# Patient Record
Sex: FEMALE | Race: WHITE | NOT HISPANIC OR LATINO | Employment: STUDENT | URBAN - METROPOLITAN AREA
[De-identification: names, ages, dates, MRNs, and addresses within clinical notes are randomized per-mention and may not be internally consistent; named-entity substitution may affect disease eponyms.]

---
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
## 2022-01-25 RX ORDER — NORETHINDRONE ACETATE AND ETHINYL ESTRADIOL .02; 1 MG/1; MG/1
1 TABLET ORAL DAILY
Qty: 30 TABLET | Refills: 11 | Status: SHIPPED | OUTPATIENT
Start: 2022-01-25 | End: 2023-01-25

## 2023-05-28 DIAGNOSIS — M25.511 RIGHT SHOULDER PAIN, UNSPECIFIED CHRONICITY: Primary | ICD-10-CM

## 2023-05-29 ENCOUNTER — TELEPHONE (OUTPATIENT)
Dept: ORTHOPEDICS | Facility: CLINIC | Age: 23
End: 2023-05-29
Payer: COMMERCIAL

## 2023-05-29 NOTE — TELEPHONE ENCOUNTER
Attempted to reach patient regarding appointment tomorrow. Called to inform patient we have scheduled x-rays prior to the appointment. Mailbox was full, unable to leave VM.

## 2023-05-30 ENCOUNTER — HOSPITAL ENCOUNTER (OUTPATIENT)
Dept: RADIOLOGY | Facility: HOSPITAL | Age: 23
Discharge: HOME | End: 2023-05-30
Attending: STUDENT IN AN ORGANIZED HEALTH CARE EDUCATION/TRAINING PROGRAM
Payer: COMMERCIAL

## 2023-05-30 ENCOUNTER — OFFICE VISIT (OUTPATIENT)
Dept: SPORTS MEDICINE | Facility: CLINIC | Age: 23
End: 2023-05-30
Payer: COMMERCIAL

## 2023-05-30 VITALS — BODY MASS INDEX: 30.22 KG/M2 | WEIGHT: 188 LBS | HEIGHT: 66 IN

## 2023-05-30 DIAGNOSIS — M25.511 RIGHT SHOULDER PAIN, UNSPECIFIED CHRONICITY: ICD-10-CM

## 2023-05-30 DIAGNOSIS — S43.431A LABRAL TEAR OF SHOULDER, RIGHT, INITIAL ENCOUNTER: ICD-10-CM

## 2023-05-30 DIAGNOSIS — M25.511 ACUTE PAIN OF RIGHT SHOULDER: Primary | ICD-10-CM

## 2023-05-30 PROCEDURE — 1159F MED LIST DOCD IN RCRD: CPT | Mod: CPTII,S$GLB,, | Performed by: STUDENT IN AN ORGANIZED HEALTH CARE EDUCATION/TRAINING PROGRAM

## 2023-05-30 PROCEDURE — 1160F PR REVIEW ALL MEDS BY PRESCRIBER/CLIN PHARMACIST DOCUMENTED: ICD-10-PCS | Mod: CPTII,S$GLB,, | Performed by: STUDENT IN AN ORGANIZED HEALTH CARE EDUCATION/TRAINING PROGRAM

## 2023-05-30 PROCEDURE — 73030 XR SHOULDER COMPLETE 2 OR MORE VIEWS RIGHT: ICD-10-PCS | Mod: 26,RT,, | Performed by: RADIOLOGY

## 2023-05-30 PROCEDURE — 99203 OFFICE O/P NEW LOW 30 MIN: CPT | Mod: S$GLB,,, | Performed by: STUDENT IN AN ORGANIZED HEALTH CARE EDUCATION/TRAINING PROGRAM

## 2023-05-30 PROCEDURE — 99203 PR OFFICE/OUTPT VISIT, NEW, LEVL III, 30-44 MIN: ICD-10-PCS | Mod: S$GLB,,, | Performed by: STUDENT IN AN ORGANIZED HEALTH CARE EDUCATION/TRAINING PROGRAM

## 2023-05-30 PROCEDURE — 3008F PR BODY MASS INDEX (BMI) DOCUMENTED: ICD-10-PCS | Mod: CPTII,S$GLB,, | Performed by: STUDENT IN AN ORGANIZED HEALTH CARE EDUCATION/TRAINING PROGRAM

## 2023-05-30 PROCEDURE — 1159F PR MEDICATION LIST DOCUMENTED IN MEDICAL RECORD: ICD-10-PCS | Mod: CPTII,S$GLB,, | Performed by: STUDENT IN AN ORGANIZED HEALTH CARE EDUCATION/TRAINING PROGRAM

## 2023-05-30 PROCEDURE — 99999 PR PBB SHADOW E&M-EST. PATIENT-LVL III: CPT | Mod: PBBFAC,,, | Performed by: STUDENT IN AN ORGANIZED HEALTH CARE EDUCATION/TRAINING PROGRAM

## 2023-05-30 PROCEDURE — 73030 X-RAY EXAM OF SHOULDER: CPT | Mod: 26,RT,, | Performed by: RADIOLOGY

## 2023-05-30 PROCEDURE — 99999 PR PBB SHADOW E&M-EST. PATIENT-LVL III: ICD-10-PCS | Mod: PBBFAC,,, | Performed by: STUDENT IN AN ORGANIZED HEALTH CARE EDUCATION/TRAINING PROGRAM

## 2023-05-30 PROCEDURE — 3008F BODY MASS INDEX DOCD: CPT | Mod: CPTII,S$GLB,, | Performed by: STUDENT IN AN ORGANIZED HEALTH CARE EDUCATION/TRAINING PROGRAM

## 2023-05-30 PROCEDURE — 1160F RVW MEDS BY RX/DR IN RCRD: CPT | Mod: CPTII,S$GLB,, | Performed by: STUDENT IN AN ORGANIZED HEALTH CARE EDUCATION/TRAINING PROGRAM

## 2023-05-30 PROCEDURE — 73030 X-RAY EXAM OF SHOULDER: CPT | Mod: TC,RT

## 2023-05-30 NOTE — PROGRESS NOTES
[facility]     Shoulder Initial Visit         5/30/2023    Referring MD: Jamel Mart*    Chief Complaint   Patient presents with    Right Shoulder - Pain         History of Present Illness:   Fabi Rice is a 23 y.o. right-hand dominant female, college  at [employer] who presents with RIGHT shoulder pain and dysfunction.    Onset of the symptoms was around the end of volleyball season during mid-October     Inciting event: no acute injury, began having pain at the end of volleyball season.      Current symptoms include intermittent sharp pain of the right shoulder.     Pain is aggravated by playing volleyball, particularly overhead serves and during pushups. Patient also endorses pain while laying on right side.       Evaluation to date: X-Ray     Treatment to date: Rest, activity modification, shoulder strengthening with school AT      Past Medical History:   History reviewed. No pertinent past medical history.    Past Surgical History:   History reviewed. No pertinent surgical history.    Medications:  Patient's Medications   New Prescriptions    No medications on file   Previous Medications    NORETHINDRONE-ETHINYL ESTRADIOL (MICROGESTIN 1/20) 1-20 MG-MCG PER TABLET    Take 1 tablet by mouth once daily.   Modified Medications    No medications on file   Discontinued Medications    No medications on file       Allergies: Review of patient's allergies indicates:  No Known Allergies    Social History:   Home town: [place], [place]  Occupation: Volleyball athlete at [employer]   Alcohol use: She reports no history of alcohol use.  Tobacco use: She reports that she has never smoked. She has never used smokeless tobacco.    Review of systems:  History of recent illness, fevers, shakes, or chills: no  History of cardiac problems or chest pain: no  History of pulmonary problems or asthma: no  History of diabetes: no  History of prior dvt or  "clotting problems: no  History of sleep apnea: no      Physical Examination:  Estimated body mass index is 30.34 kg/m² as calculated from the following:    Height as of this encounter: 5' 6" (1.676 m).    Weight as of this encounter: 85.3 kg (188 lb).    General  Healthy appearing female in no acute distress  Alert and oriented, normal mood, appropriate affect    Shoulder Examination:  Patient is alert and oriented, no distress. Skin is intact. Neuro is normal with no focal motor or sensory findings.    Cervical exam is unremarkable. Intact cervical ROM. Negative Spurling's test    Physical Exam:  RIGHT    LEFT    Scap Dyskinesis/Winging (-)    (-)  Forward Shoulder Posture +    +    Tenderness:          Greater Tuberosity             (-)    (-)  Bicipital Groove  +    (-)  AC joint   (-)    (-)  Other:     ROM:  Forward Elevation 180    180  Abduction  120    120  ER (at side)  90    90  IR   T4 / 90 in abd   T4    Strength:   Supraspinatus  5/5    5/5  Infraspinatus  5/5    5/5  Subscap / IR  5/5    5/5     Special Tests:   Apprehension:   *pain    (-)   Abner Relocation:  (-)    (-)   Neer:    (-)    (-)   Mohamud:   +    (-)   SS Stress:   (-)    (-)   Bear Hug:   (-)    (-)   Breathitt's:   +    (-)   Resisted Thrower's:   +    (-)   Cross Arm Abduction:  (-)    (-)   Posterior Load/Shift  +    (-)   Mary Test   +    (-)    Neurovascular examination  - Motor grossly intact bilaterally to shoulder abduction, elbow flexion and extension, wrist flexion and extension, and intrinsic hand musculature  - Sensation intact to light touch bilaterally in axillary, median, radial, and ulnar distributions  - Symmetrical radial pulses      Imaging:  XR Results:  Results for orders placed during the hospital encounter of 05/30/23    X-ray Shoulder 2 or More Views Right    Narrative  EXAM: XR SHOULDER COMPLETE 2 OR MORE VIEWS RIGHT    CLINICAL INDICATION:  Pain    PRIORS:  06/05/2020    FINDINGS:  No acute fracture or dislocation. "  No acute or adverse osseous finding.  Joint spaces maintained.    Impression  No acute or adverse osseous finding    Finalized on: 5/30/2023 4:12 PM By:  Christine Rosenberg MD  [facility]# [id number]      2023-05-30 16:[id number]    [facility]    Physician Read: I agree with the above impression    Impression:  23 y.o. 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 Her symptoms and exam findings are most consistent with injury to the posterior and superior labrum.  She has tried extensive non-operative management including 6 months of combined rest, nsaids, and physical therapy.   I recommend MRI arthrogram of the shoulder to evaluate for labral tear.   We have explained that surgical treatments for this issue are not likely to be an option if she doesn't want to miss her upcoming senior volleyball season.  She may be a candidate for PRP injection.  Follow up with me after MRI           Jamel Mart MD

## 2023-05-31 ENCOUNTER — TELEPHONE (OUTPATIENT)
Dept: SPORTS MEDICINE | Facility: CLINIC | Age: 23
End: 2023-05-31
Payer: COMMERCIAL

## 2023-07-08 NOTE — PROGRESS NOTES
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 female who is here for f/u evaluation of her right shoulder. The patient was last seen here by me on 5/30/23 at which point we decided to send her for an MR arthrogram prior to considering further treatment options. The patient returns today to review her MRI and discuss further treatment options. She reports that her shoulder is not overly symptomatic at this time as she has not been active with sporting activities over the last few months.     To review her history, Fabi Rice is a 23 y.o. right-hand dominant female, college  at [employer] who presented on 5/30/23 with RIGHT shoulder pain and dysfunction that had begun around the end of volleyball season during mid-October. There was no acute injury or trauma. Her symptoms included intermittent sharp pain of the right shoulder. Her pain was aggravated by playing volleyball, particularly overhead serves and during pushups. Patient also endorsed pain while laying on right side. She had tried rest, activity modification, and shoulder strengthening with school AT[employer]. Her symptoms and exam findings are most consistent with injury to the posterior and superior labrum, so we proceeded with MR Arthrogram for further evaluation.     Patient's medications, allergies, past medical, surgical, social and family histories were reviewed and updated as appropriate.    Review of Systems   All systems reviewed were negative.  Specifically, the patient denies fever, chills, weight loss, chest pain, shortness of breath, or dyspnea on exertion.      No past medical history on file.    No past surgical history on file.    Patient's Medications   New Prescriptions    No medications on file   Previous Medications    NORETHINDRONE-ETHINYL  ESTRADIOL (MICROGESTIN 1/20) 1-20 MG-MCG PER TABLET    Take 1 tablet by mouth once daily.   Modified Medications    No medications on file   Discontinued Medications    No medications on file       Family History   Problem Relation Age of Onset    Breast cancer Neg Hx     Ovarian cancer Neg Hx        Review of patient's allergies indicates:  No Known Allergies      Objective:      Physical Exam  Patient is alert and oriented, no distress. Skin is intact. Neuro is normal with no focal motor or sensory findings.    Cervical exam is unremarkable. Intact cervical ROM. Negative Spurling's test    Physical Exam:                       RIGHT                                     LEFT     Scap Dyskinesis/Winging       (-)                                             (-)  Forward Shoulder Posture      +                                              +     Tenderness:                                                                              Greater Tuberosity                  (-)                                            (-)  Bicipital Groove                       +                                              (-)  AC joint                                   (-)                                             (-)  Other:      ROM:  Forward Elevation       180                                          180  Abduction                    120                                          120  ER (at side)                 90                                            90  IR                                 T4 / 90 in abd                          T4     Strength:   Supraspinatus             5/5                                           5/5  Infraspinatus               5/5                                           5/5  Subscap / IR               5/5                                           5/5      Special Tests:              Apprehension:                         *pain                                        (-)              Abner Relocation:                      (-)                                             (-)              Neer:                                       (-)                                             (-)              Mohamud:                                 +                                              (-)              SS Stress:                               (-)                                             (-)              Bear Hug:                                (-)                                             (-)              Campbell's:                                 +                                              (-)              Resisted Thrower's:                +                                              (-)              Cross Arm Abduction:             (-)                                             (-)              Posterior Load/Shift                +                                              (-)              Mary Test                                  +                                              (-)    Neurovascular examination  - Motor grossly intact bilaterally to shoulder abduction, elbow flexion and extension, wrist flexion and extension, and intrinsic hand musculature  - Sensation intact to light touch bilaterally in axillary, median, radial, and ulnar distributions  - Symmetrical radial pulses    Imaging:    XR Results:  Results for orders placed during the hospital encounter of 05/30/23    X-ray Shoulder 2 or More Views Right    Narrative  EXAM: XR SHOULDER COMPLETE 2 OR MORE VIEWS RIGHT    CLINICAL INDICATION:  Pain    PRIORS:  06/05/2020    FINDINGS:  No acute fracture or dislocation.  No acute or adverse osseous finding.  Joint spaces maintained.    Impression  No acute or adverse osseous finding    Finalized on: 5/30/2023 4:12 PM By:  Christine Rosenberg MD  [facility]# [id number]      2023-05-30 [id number]    [facility]      MRI Results:  MRI Arthrogram Shoulder With Contrast Right  Narrative: EXAMINATION:  MRI ARTHROGRAM SHOULDER  WITH CONTRAST RIGHT    CLINICAL HISTORY:  Shoulder pain, labral tear suspected, xray done;  Pain in right shoulder    TECHNIQUE:  MRI of the shoulder was performed after administration of intra-articular contrast utilizing the following sequences: Axial, sagittal and coronal T1 FS; coronal and sagittal T2 FS    COMPARISON:  None.    FINDINGS:  Rotator cuff: Supraspinatus demonstrates articular surface partial tear (7-9 mm in AP dimension), suspected low-grade with some mild intrasubstance interstitial extension.  Infraspinatus, teres minor, and subscapularis tendons are intact.  Muscle bulk is maintained.    Labrum: Posterosuperior labral tear suspected.    Biceps: Long head biceps tendon is intact.    Bone: There is no fracture.  Bone marrow signal is unremarkable.    Acromioclavicular joint: The AC joint is unremarkable.    Cartilage: Articular cartilage of the glenohumeral joint is preserved.  Impression: 1. Posterosuperior tear suspected  2. Supraspinatus articular surface tear, suspected low-grade.    Electronically signed by: Delvis Hays MD  Date:    07/11/2023  Time:    13:42       CT Results:  No results found for this or any previous visit.      Physician read: I agree with the above impression.    Assessment/Plan:   Fabi Rice is a 23 y.o. female with right shoulder partial thickness articular sided rotator cuff tear and posterior superior glenoid labrum tear     Plan:    Reviewed MRI with the patient today. Her MRI shows partial thickness articular sided rotator cuff tear as well as posterior superior glenoid labrum tearing.   We discussed non-operative treatment options in the form of rest, activity modifications, oral anti-inflammatories, corticosteroid versus PRP injection, and physical therapy/physician directed home exercise program versus potential operative treatment.   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   Based on her timeline I recommend US guided intra-articular CSI. We discussed that we can see how she does during the season and afterward to help guide any potential operative treatments of her rotator cuff.   Follow up at end of July for CSI.           Jamel Mart MD    I, Alfonzo Clayton, acted as a scribe for Jamel Mart MD for the duration of this office visit.

## 2023-07-11 ENCOUNTER — HOSPITAL ENCOUNTER (OUTPATIENT)
Dept: RADIOLOGY | Facility: HOSPITAL | Age: 23
Discharge: HOME | End: 2023-07-11
Attending: STUDENT IN AN ORGANIZED HEALTH CARE EDUCATION/TRAINING PROGRAM
Payer: COMMERCIAL

## 2023-07-11 ENCOUNTER — OFFICE VISIT (OUTPATIENT)
Dept: SPORTS MEDICINE | Facility: CLINIC | Age: 23
End: 2023-07-11
Payer: COMMERCIAL

## 2023-07-11 VITALS — BODY MASS INDEX: 30.22 KG/M2 | WEIGHT: 188 LBS | HEIGHT: 66 IN

## 2023-07-11 DIAGNOSIS — M75.111 NONTRAUMATIC INCOMPLETE TEAR OF RIGHT ROTATOR CUFF: Primary | ICD-10-CM

## 2023-07-11 DIAGNOSIS — M25.511 ACUTE PAIN OF RIGHT SHOULDER: ICD-10-CM

## 2023-07-11 DIAGNOSIS — S43.431A LABRAL TEAR OF SHOULDER, RIGHT, INITIAL ENCOUNTER: ICD-10-CM

## 2023-07-11 DIAGNOSIS — S43.431D TEAR OF RIGHT GLENOID LABRUM, SUBSEQUENT ENCOUNTER: ICD-10-CM

## 2023-07-11 PROCEDURE — 1159F MED LIST DOCD IN RCRD: CPT | Mod: CPTII,S$GLB,, | Performed by: STUDENT IN AN ORGANIZED HEALTH CARE EDUCATION/TRAINING PROGRAM

## 2023-07-11 PROCEDURE — 73040 CONTRAST X-RAY OF SHOULDER: CPT | Mod: 26,RT,, | Performed by: RADIOLOGY

## 2023-07-11 PROCEDURE — 73222 MRI JOINT UPR EXTREM W/DYE: CPT | Mod: 26,RT,, | Performed by: RADIOLOGY

## 2023-07-11 PROCEDURE — 25500020 PHARM REV CODE 255: Performed by: STUDENT IN AN ORGANIZED HEALTH CARE EDUCATION/TRAINING PROGRAM

## 2023-07-11 PROCEDURE — 23350 XR ARTHROGRAM SHOULDER RIGHT, COMPLETE (XPD): ICD-10-PCS | Mod: RT,,, | Performed by: RADIOLOGY

## 2023-07-11 PROCEDURE — 1160F PR REVIEW ALL MEDS BY PRESCRIBER/CLIN PHARMACIST DOCUMENTED: ICD-10-PCS | Mod: CPTII,S$GLB,, | Performed by: STUDENT IN AN ORGANIZED HEALTH CARE EDUCATION/TRAINING PROGRAM

## 2023-07-11 PROCEDURE — 73222 MRI ARTHROGRAM SHOULDER WITH CONTRAST RIGHT: ICD-10-PCS | Mod: 26,RT,, | Performed by: RADIOLOGY

## 2023-07-11 PROCEDURE — 73222 MRI JOINT UPR EXTREM W/DYE: CPT | Mod: TC,RT

## 2023-07-11 PROCEDURE — 1159F PR MEDICATION LIST DOCUMENTED IN MEDICAL RECORD: ICD-10-PCS | Mod: CPTII,S$GLB,, | Performed by: STUDENT IN AN ORGANIZED HEALTH CARE EDUCATION/TRAINING PROGRAM

## 2023-07-11 PROCEDURE — 3008F BODY MASS INDEX DOCD: CPT | Mod: CPTII,S$GLB,, | Performed by: STUDENT IN AN ORGANIZED HEALTH CARE EDUCATION/TRAINING PROGRAM

## 2023-07-11 PROCEDURE — 1160F RVW MEDS BY RX/DR IN RCRD: CPT | Mod: CPTII,S$GLB,, | Performed by: STUDENT IN AN ORGANIZED HEALTH CARE EDUCATION/TRAINING PROGRAM

## 2023-07-11 PROCEDURE — 3008F PR BODY MASS INDEX (BMI) DOCUMENTED: ICD-10-PCS | Mod: CPTII,S$GLB,, | Performed by: STUDENT IN AN ORGANIZED HEALTH CARE EDUCATION/TRAINING PROGRAM

## 2023-07-11 PROCEDURE — 23350 INJECTION FOR SHOULDER X-RAY: CPT | Mod: RT,,, | Performed by: RADIOLOGY

## 2023-07-11 PROCEDURE — 99999 PR PBB SHADOW E&M-EST. PATIENT-LVL III: CPT | Mod: PBBFAC,,, | Performed by: STUDENT IN AN ORGANIZED HEALTH CARE EDUCATION/TRAINING PROGRAM

## 2023-07-11 PROCEDURE — 99214 OFFICE O/P EST MOD 30 MIN: CPT | Mod: S$GLB,,, | Performed by: STUDENT IN AN ORGANIZED HEALTH CARE EDUCATION/TRAINING PROGRAM

## 2023-07-11 PROCEDURE — 73040 XR ARTHROGRAM SHOULDER RIGHT, COMPLETE (XPD): ICD-10-PCS | Mod: 26,RT,, | Performed by: RADIOLOGY

## 2023-07-11 PROCEDURE — 99214 PR OFFICE/OUTPT VISIT, EST, LEVL IV, 30-39 MIN: ICD-10-PCS | Mod: S$GLB,,, | Performed by: STUDENT IN AN ORGANIZED HEALTH CARE EDUCATION/TRAINING PROGRAM

## 2023-07-11 PROCEDURE — 99999 PR PBB SHADOW E&M-EST. PATIENT-LVL III: ICD-10-PCS | Mod: PBBFAC,,, | Performed by: STUDENT IN AN ORGANIZED HEALTH CARE EDUCATION/TRAINING PROGRAM

## 2023-07-11 PROCEDURE — 23350 INJECTION FOR SHOULDER X-RAY: CPT | Mod: RT

## 2023-07-11 RX ADMIN — IOHEXOL 15 ML: 350 INJECTION, SOLUTION INTRAVENOUS at 12:07

## 2023-07-28 ENCOUNTER — OFFICE VISIT (OUTPATIENT)
Dept: SPORTS MEDICINE | Facility: CLINIC | Age: 23
End: 2023-07-28
Payer: COMMERCIAL

## 2023-07-28 VITALS — BODY MASS INDEX: 30.22 KG/M2 | HEIGHT: 66 IN | WEIGHT: 188 LBS

## 2023-07-28 DIAGNOSIS — Z02.5 SPORTS PHYSICAL: Primary | ICD-10-CM

## 2023-07-28 DIAGNOSIS — M75.111 NONTRAUMATIC INCOMPLETE TEAR OF RIGHT ROTATOR CUFF: ICD-10-CM

## 2023-07-28 DIAGNOSIS — S43.431D TEAR OF RIGHT GLENOID LABRUM, SUBSEQUENT ENCOUNTER: ICD-10-CM

## 2023-07-28 PROCEDURE — 1159F MED LIST DOCD IN RCRD: CPT | Mod: CPTII,S$GLB,, | Performed by: STUDENT IN AN ORGANIZED HEALTH CARE EDUCATION/TRAINING PROGRAM

## 2023-07-28 PROCEDURE — 99214 PR OFFICE/OUTPT VISIT, EST, LEVL IV, 30-39 MIN: ICD-10-PCS | Mod: 25,S$GLB,, | Performed by: STUDENT IN AN ORGANIZED HEALTH CARE EDUCATION/TRAINING PROGRAM

## 2023-07-28 PROCEDURE — 1159F PR MEDICATION LIST DOCUMENTED IN MEDICAL RECORD: ICD-10-PCS | Mod: CPTII,S$GLB,, | Performed by: STUDENT IN AN ORGANIZED HEALTH CARE EDUCATION/TRAINING PROGRAM

## 2023-07-28 PROCEDURE — 99999 PR PBB SHADOW E&M-EST. PATIENT-LVL III: ICD-10-PCS | Mod: PBBFAC,,, | Performed by: STUDENT IN AN ORGANIZED HEALTH CARE EDUCATION/TRAINING PROGRAM

## 2023-07-28 PROCEDURE — 3008F PR BODY MASS INDEX (BMI) DOCUMENTED: ICD-10-PCS | Mod: CPTII,S$GLB,, | Performed by: STUDENT IN AN ORGANIZED HEALTH CARE EDUCATION/TRAINING PROGRAM

## 2023-07-28 PROCEDURE — 20611 DRAIN/INJ JOINT/BURSA W/US: CPT | Mod: RT,S$GLB,, | Performed by: STUDENT IN AN ORGANIZED HEALTH CARE EDUCATION/TRAINING PROGRAM

## 2023-07-28 PROCEDURE — 99999 PR PBB SHADOW E&M-EST. PATIENT-LVL III: CPT | Mod: PBBFAC,,, | Performed by: STUDENT IN AN ORGANIZED HEALTH CARE EDUCATION/TRAINING PROGRAM

## 2023-07-28 PROCEDURE — 99214 OFFICE O/P EST MOD 30 MIN: CPT | Mod: 25,S$GLB,, | Performed by: STUDENT IN AN ORGANIZED HEALTH CARE EDUCATION/TRAINING PROGRAM

## 2023-07-28 PROCEDURE — 3008F BODY MASS INDEX DOCD: CPT | Mod: CPTII,S$GLB,, | Performed by: STUDENT IN AN ORGANIZED HEALTH CARE EDUCATION/TRAINING PROGRAM

## 2023-07-28 PROCEDURE — 20611 LARGE JOINT ASPIRATION/INJECTION: R GLENOHUMERAL: ICD-10-PCS | Mod: RT,S$GLB,, | Performed by: STUDENT IN AN ORGANIZED HEALTH CARE EDUCATION/TRAINING PROGRAM

## 2023-07-28 RX ORDER — TRIAMCINOLONE ACETONIDE 40 MG/ML
40 INJECTION, SUSPENSION INTRA-ARTICULAR; INTRAMUSCULAR
Status: DISCONTINUED | OUTPATIENT
Start: 2023-07-28 | End: 2023-07-28 | Stop reason: HOSPADM

## 2023-07-28 RX ADMIN — TRIAMCINOLONE ACETONIDE 40 MG: 40 INJECTION, SUSPENSION INTRA-ARTICULAR; INTRAMUSCULAR at 11:07

## 2023-07-28 NOTE — PROGRESS NOTES
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     Medical history was reviewed.      Cardiac screening for history of personal chest pain, shortness of breath, irregular heartbeat or presyncope/syncope are all negative.  The patient has never seen a cardiologist nor had an EKG ,echocardiogram or been told a heart murmur.      No significant history of concussion  No significant history asthma  No significant history of heat related illness  No significant family history of sickle cell trait or sickle cell disease    History obtained from patient/parent.     Past family, medical, social, surgical history, and vital signs reviewed in chart.    ROS    Exam:       Neck  Normal forward flexion, extension, rotation, and lateral flexion of neck.    Back  Normal forward flexion and extension. No scoliosis.    Shoulder/arm  Normal abduction, forward flexion, internal and external rotation. Normal rotator cuff strength.    Elbow/forearm  Normal elbow flexion and extension.    Wrist/hand/fingers  Normal wrist and digit flexion and extension.    Hip/thigh  Normal hip flexion, internal rotation (30°), and external rotation (55°). FADIR and ELVA are negative for pain.    Knee  Negative for effusion. Normal flexion and extension. Normal Lachman and normal posterior drawer.    Leg/ankle  Normal plantarflexion and dorsiflexion. Negative anterior drawer testing.    Foot/toes  Normal alignment.    Functional  Normal duck walk, single leg squat.          IMPRESSION/PLAN: Fabi is a 23 y.o.  female who presents today for routine sports physical.  The medical history reviewed today, and physical exam is overall benign.  The athlete is cleared for full sports activity.  Their paperwork was signed in filled out appropriately given to his/her legal parent/guardian/patient.    Follow-up as needed in the future.    Misael Cueto

## 2023-07-28 NOTE — PROCEDURES
Large Joint Aspiration/Injection: R glenohumeral    Date/Time: 7/28/2023 11:40 AM  Performed by: Misael Cueto MD  Authorized by: Misael Cueto MD     Consent Done?:  Yes (Verbal)  Indications:  Pain  Site marked: the procedure site was marked    Timeout: prior to procedure the correct patient, procedure, and site was verified    Prep: patient was prepped and draped in usual sterile fashion      Local anesthesia used?: Yes    Local anesthetic:  Topical anesthetic    Details:  Needle Size:  21 G  Ultrasonic Guidance for needle placement?: Yes    Images are saved and documented.  Approach:  Posterior  Location:  Shoulder  Site:  R glenohumeral  Medications:  40 mg triamcinolone acetonide 40 mg/mL  Patient tolerance:  Patient tolerated the procedure well with no immediate complications     Ultrasound guidance was used for needle localization. Images were saved and stored for documentation. The appropriate structures were visualized. Dynamic visualization of the needle was continuous throughout the procedures and maintained good position.     We discussed the proper protocols after the injection such as no submerging pools, baths tubs, or hot tubs for 24 hr.  Showering is okay today.  We also discussed that blood sugars can be elevated after an injection and asked patient to properly checked her sugars over the next few days and contact their PCP if there are any concerns.  We discussed red flags such as fevers, chills, red, warm, tender joint at the area of injection to please seek medical care immediately.